# Patient Record
Sex: FEMALE | Race: WHITE | NOT HISPANIC OR LATINO | URBAN - METROPOLITAN AREA
[De-identification: names, ages, dates, MRNs, and addresses within clinical notes are randomized per-mention and may not be internally consistent; named-entity substitution may affect disease eponyms.]

---

## 2021-08-19 ENCOUNTER — NEW PATIENT COMPREHENSIVE (OUTPATIENT)
Dept: URBAN - METROPOLITAN AREA CLINIC 49 | Facility: CLINIC | Age: 66
End: 2021-08-19

## 2021-08-19 DIAGNOSIS — H04.123: ICD-10-CM

## 2021-08-19 DIAGNOSIS — H25.811: ICD-10-CM

## 2021-08-19 DIAGNOSIS — H25.12: ICD-10-CM

## 2021-08-19 DIAGNOSIS — H43.812: ICD-10-CM

## 2021-08-19 PROCEDURE — 68761 CLOSE TEAR DUCT OPENING: CPT

## 2021-08-19 PROCEDURE — 92004 COMPRE OPH EXAM NEW PT 1/>: CPT

## 2021-08-19 PROCEDURE — 92134 CPTRZ OPH DX IMG PST SGM RTA: CPT

## 2021-08-19 ASSESSMENT — VISUAL ACUITY
OD_GLARE: 20/30
OS_SC: 20/80-1
OU_SC: J1
OD_PH: 20/30-1
OS_PH: 20/70+2
OD_GLARE: 20/30
OS_GLARE: 20/60
OS_GLARE: 20/60
OD_SC: 20/50

## 2021-08-19 ASSESSMENT — TONOMETRY
OS_IOP_MMHG: 15
OD_IOP_MMHG: 16

## 2021-08-19 NOTE — PATIENT DISCUSSION
Discussed with patient that in addition to this treatment it is recommended to do hydro gel punctal plugs bilateral lower lids today.

## 2021-08-19 NOTE — PATIENT DISCUSSION
discussed with patient that having chemo can cause dryness,  recommend patient increase artificial tears to 4 times a day (get the omega 3 refresh) and start artificial tear gel at bedtime.

## 2021-08-19 NOTE — PATIENT DISCUSSION
will recheck in 1 month,  if patient needs a little bit more relief will place bilateral upper lids.

## 2021-09-16 ENCOUNTER — 1 MONTH FOLLOW-UP (OUTPATIENT)
Dept: URBAN - METROPOLITAN AREA CLINIC 49 | Facility: CLINIC | Age: 66
End: 2021-09-16

## 2021-09-16 DIAGNOSIS — H04.123: ICD-10-CM

## 2021-09-16 DIAGNOSIS — Z98.890: ICD-10-CM

## 2021-09-16 PROCEDURE — 92012 INTRM OPH EXAM EST PATIENT: CPT

## 2021-09-16 ASSESSMENT — VISUAL ACUITY
OD_PH: 20/30-1
OS_SC: 20/60
OS_PH: 20/40
OD_SC: 20/40-2

## 2021-09-16 ASSESSMENT — TONOMETRY
OS_IOP_MMHG: 17
OD_IOP_MMHG: 16

## 2021-09-21 ENCOUNTER — BIOMETRY (OUTPATIENT)
Dept: URBAN - METROPOLITAN AREA CLINIC 49 | Facility: CLINIC | Age: 66
End: 2021-09-21

## 2021-09-21 DIAGNOSIS — H25.813: ICD-10-CM

## 2021-09-21 PROCEDURE — 92136 OPHTHALMIC BIOMETRY: CPT

## 2021-09-21 PROCEDURE — TOPOIOL CORNEAL TOPOGRAPHY-PREMIUM IOL

## 2021-09-21 ASSESSMENT — KERATOMETRY
OD_AXISANGLE_DEGREES: 121
OS_K1POWER_DIOPTERS: 47.37
OS_AXISANGLE2_DEGREES: 170
OS_AXISANGLE_DEGREES: 080
OD_K1POWER_DIOPTERS: 44.00
OD_AXISANGLE2_DEGREES: 31
OD_K2POWER_DIOPTERS: 43.37
OS_K2POWER_DIOPTERS: 45.00

## 2021-09-27 ENCOUNTER — PRE OP - CE/IOL OS (OUTPATIENT)
Dept: URBAN - METROPOLITAN AREA CLINIC 53 | Facility: CLINIC | Age: 66
End: 2021-09-27

## 2021-09-27 DIAGNOSIS — H43.812: ICD-10-CM

## 2021-09-27 DIAGNOSIS — H25.812: ICD-10-CM

## 2021-09-27 PROCEDURE — 92134 CPTRZ OPH DX IMG PST SGM RTA: CPT

## 2021-09-27 PROCEDURE — PREOP PRE OP VISIT

## 2021-09-27 ASSESSMENT — KERATOMETRY
OD_K1POWER_DIOPTERS: 44.00
OD_AXISANGLE_DEGREES: 121
OD_K2POWER_DIOPTERS: 43.37
OS_AXISANGLE_DEGREES: 080
OS_K2POWER_DIOPTERS: 45.00
OS_K1POWER_DIOPTERS: 47.37
OD_AXISANGLE2_DEGREES: 31
OS_AXISANGLE2_DEGREES: 170

## 2021-09-27 ASSESSMENT — VISUAL ACUITY
OD_PH: 20/25
OD_SC: 20/40
OS_PH: 20/40
OS_SC: 20/150

## 2021-09-27 ASSESSMENT — TONOMETRY
OS_IOP_MMHG: 14
OD_IOP_MMHG: 14

## 2021-09-27 NOTE — PATIENT DISCUSSION
CATARACT SURGERY PLANNER - TORIC IOL/+FEMTO: Phacoemulsification with IOL: Eye: left|DOS: 10/13/21|Model: [id number]|Power: 17(ORA)|Target: near|Femto: yes|Axis: 75°|Visc: duet|Omidria: yes|10% Phenylephrine: no|Epi-shugarcaine: yes|Phaco Setting: denst|BSS+: no|Trypan Blue: no|CTR: no|Olive Tip: no|Atropine: no|Pupilloplasty: no|Notes: HLASIK, MonoVA, Near.

## 2021-09-27 NOTE — PATIENT DISCUSSION
LENS OPTION (SUPERIOR MONO): Discussed with patient in detail all available methods and lens options as well as their associated benefits, limitations and out-of-pocket costs. Patient chooses femtosecond laser-assisted cataract surgery with toric monofocal lens implant set for monovision, right for distance, left for near. The patient also understands that with any IOL there is no guarantee that they will not require glasses to see their best at any distance after surgery. The risks, benefits and alternatives to surgery were explained and all questions were answered.

## 2021-10-13 ENCOUNTER — SURGERY/PROCEDURE (OUTPATIENT)
Dept: URBAN - METROPOLITAN AREA SURGERY 16 | Facility: SURGERY | Age: 66
End: 2021-10-13

## 2021-10-13 ENCOUNTER — SAME DAY PO (OUTPATIENT)
Dept: URBAN - METROPOLITAN AREA CLINIC 48 | Facility: CLINIC | Age: 66
End: 2021-10-13

## 2021-10-13 DIAGNOSIS — H25.812: ICD-10-CM

## 2021-10-13 DIAGNOSIS — Z96.1: ICD-10-CM

## 2021-10-13 DIAGNOSIS — Z98.42: ICD-10-CM

## 2021-10-13 PROCEDURE — DIUXXFEMTO EYHANCE TORIC IOL WITH FEMTO

## 2021-10-13 PROCEDURE — 66984 XCAPSL CTRC RMVL W/O ECP: CPT

## 2021-10-13 PROCEDURE — 99024 POSTOP FOLLOW-UP VISIT: CPT

## 2021-10-13 ASSESSMENT — KERATOMETRY
OD_AXISANGLE2_DEGREES: 31
OD_AXISANGLE_DEGREES: 121
OD_K1POWER_DIOPTERS: 44.00
OS_AXISANGLE_DEGREES: 080
OS_K1POWER_DIOPTERS: 47.37
OS_AXISANGLE2_DEGREES: 170
OS_K1POWER_DIOPTERS: 47.37
OS_K2POWER_DIOPTERS: 45.00
OD_K2POWER_DIOPTERS: 43.37
OS_AXISANGLE2_DEGREES: 170
OD_K1POWER_DIOPTERS: 44.00
OS_AXISANGLE_DEGREES: 080
OD_AXISANGLE2_DEGREES: 31
OS_K2POWER_DIOPTERS: 45.00
OD_K2POWER_DIOPTERS: 43.37
OD_AXISANGLE_DEGREES: 121

## 2021-10-13 ASSESSMENT — TONOMETRY: OS_IOP_MMHG: 14

## 2021-10-13 ASSESSMENT — VISUAL ACUITY: OS_CC: 20/100-1

## 2021-10-29 ENCOUNTER — PRE OP - CE/IOL OD / 1 WEEK PO OS (OUTPATIENT)
Dept: URBAN - METROPOLITAN AREA CLINIC 49 | Facility: CLINIC | Age: 66
End: 2021-10-29

## 2021-10-29 DIAGNOSIS — H43.813: ICD-10-CM

## 2021-10-29 DIAGNOSIS — Z98.42: ICD-10-CM

## 2021-10-29 PROCEDURE — 92134 CPTRZ OPH DX IMG PST SGM RTA: CPT

## 2021-10-29 PROCEDURE — 99024 POSTOP FOLLOW-UP VISIT: CPT

## 2021-10-29 RX ORDER — CYCLOSPORINE 0.5 MG/ML: 1 EMULSION OPHTHALMIC TWICE A DAY

## 2021-10-29 RX ORDER — B-COMPLEX WITH VITAMIN C: CAPSULE ORAL TWICE A DAY

## 2021-10-29 ASSESSMENT — KERATOMETRY
OS_K2POWER_DIOPTERS: 45.00
OD_AXISANGLE2_DEGREES: 31
OD_AXISANGLE_DEGREES: 121
OS_AXISANGLE2_DEGREES: 170
OS_AXISANGLE_DEGREES: 080
OD_K1POWER_DIOPTERS: 44.00
OD_K2POWER_DIOPTERS: 43.37
OS_K1POWER_DIOPTERS: 47.37

## 2021-10-29 ASSESSMENT — VISUAL ACUITY
OD_PH: 20/30+1
OS_PH: 20/30-1
OD_SC: 20/40-1
OS_SC: J1+(-1)
OU_SC: 20/40-1
OU_SC: J1+(-2)
OS_SC: 20/100-1

## 2021-10-29 ASSESSMENT — TONOMETRY
OD_IOP_MMHG: 13
OS_IOP_MMHG: 14

## 2022-02-04 ENCOUNTER — PRE-OP/H&P (OUTPATIENT)
Dept: URBAN - METROPOLITAN AREA CLINIC 49 | Facility: CLINIC | Age: 67
End: 2022-02-04

## 2022-02-04 DIAGNOSIS — H25.811: ICD-10-CM

## 2022-02-04 PROCEDURE — PREOP PRE OP VISIT

## 2022-02-04 PROCEDURE — 92136 - 2N OPHTHALMIC BIOMETRY BY PARTIAL COHERENCE INTERFEROMETRY WITH INTRAOCULAR LENS POWER CALCULATION

## 2022-02-04 ASSESSMENT — KERATOMETRY
OD_AXISANGLE2_DEGREES: 31
OS_K2POWER_DIOPTERS: 45.00
OD_K2POWER_DIOPTERS: 43.37
OD_K1POWER_DIOPTERS: 44.00
OS_K1POWER_DIOPTERS: 47.37
OS_AXISANGLE_DEGREES: 080
OS_AXISANGLE2_DEGREES: 170
OD_AXISANGLE_DEGREES: 121

## 2022-02-04 ASSESSMENT — VISUAL ACUITY
OD_GLARE: 20/50
OS_SC: 20/50-2
OS_SC: J1
OS_PH: 20/30-1
OD_PH: 20/30-1
OD_SC: 20/40-2
OD_GLARE: 20/70

## 2022-02-04 ASSESSMENT — TONOMETRY
OD_IOP_MMHG: 14
OS_IOP_MMHG: 13

## 2022-02-04 NOTE — PATIENT DISCUSSION
CATARACT SURGERY PLANNER - STANDARD IOL/+FEMTO: Phacoemulsification with IOL: Eye: OD|DOS: 02/09/2022|Model: [id number]|Power: 19. 5|Target: PLANO|Femto: YES|Arcs: 34 @ 100 ; 34 @ 280|Visc: duet|Omidria: YES|10% Phenylephrine: NO|Epi-shugarcaine: YES|Phaco Setting: DENSE|BSS+: NO|Trypan Blue: NO|CTR: NO|Olive Tip: NO|Atropine: NO|Pupilloplasty: NO|Notes: H LASIK.

## 2022-02-09 ENCOUNTER — POST-OP (OUTPATIENT)
Dept: URBAN - METROPOLITAN AREA CLINIC 48 | Facility: CLINIC | Age: 67
End: 2022-02-09

## 2022-02-09 ENCOUNTER — SURGERY/PROCEDURE (OUTPATIENT)
Dept: URBAN - METROPOLITAN AREA SURGERY 16 | Facility: SURGERY | Age: 67
End: 2022-02-09

## 2022-02-09 DIAGNOSIS — H25.811: ICD-10-CM

## 2022-02-09 DIAGNOSIS — Z98.41: ICD-10-CM

## 2022-02-09 PROBLEM — Z96.1: Noted: 2022-02-09

## 2022-02-09 PROCEDURE — 99024 POSTOP FOLLOW-UP VISIT: CPT

## 2022-02-09 PROCEDURE — 66984 XCAPSL CTRC RMVL W/O ECP: CPT

## 2022-02-09 ASSESSMENT — KERATOMETRY
OD_AXISANGLE2_DEGREES: 31
OD_AXISANGLE2_DEGREES: 31
OS_K1POWER_DIOPTERS: 47.37
OD_K2POWER_DIOPTERS: 43.37
OD_K1POWER_DIOPTERS: 44.00
OS_K2POWER_DIOPTERS: 45.00
OS_K1POWER_DIOPTERS: 47.37
OS_K2POWER_DIOPTERS: 45.00
OS_AXISANGLE_DEGREES: 080
OS_AXISANGLE_DEGREES: 080
OS_AXISANGLE2_DEGREES: 170
OD_K2POWER_DIOPTERS: 43.37
OD_AXISANGLE_DEGREES: 121
OD_K1POWER_DIOPTERS: 44.00
OD_AXISANGLE_DEGREES: 121
OS_AXISANGLE2_DEGREES: 170

## 2022-02-09 ASSESSMENT — VISUAL ACUITY: OD_SC: 20/50

## 2022-02-09 ASSESSMENT — TONOMETRY: OD_IOP_MMHG: 28

## 2022-02-25 ENCOUNTER — POST-OP (OUTPATIENT)
Dept: URBAN - METROPOLITAN AREA CLINIC 49 | Facility: CLINIC | Age: 67
End: 2022-02-25

## 2022-02-25 DIAGNOSIS — Z96.1: ICD-10-CM

## 2022-02-25 DIAGNOSIS — Z98.41: ICD-10-CM

## 2022-02-25 PROCEDURE — 99024 POSTOP FOLLOW-UP VISIT: CPT

## 2022-02-25 ASSESSMENT — KERATOMETRY
OD_AXISANGLE_DEGREES: 121
OD_K2POWER_DIOPTERS: 43.37
OD_K1POWER_DIOPTERS: 44.00
OS_K1POWER_DIOPTERS: 47.37
OS_K2POWER_DIOPTERS: 45.00
OD_AXISANGLE2_DEGREES: 31
OS_AXISANGLE_DEGREES: 080
OS_AXISANGLE2_DEGREES: 170

## 2022-02-25 ASSESSMENT — VISUAL ACUITY
OS_PH: 20/25-2
OS_SC: 20/60
OD_SC: 20/30-1
OD_PH: 20/30+2
OD_SC: J5
OD_SC: J5

## 2022-02-25 ASSESSMENT — TONOMETRY
OS_IOP_MMHG: 10
OD_IOP_MMHG: 12

## 2022-03-11 ENCOUNTER — POST-OP (OUTPATIENT)
Dept: URBAN - METROPOLITAN AREA CLINIC 49 | Facility: CLINIC | Age: 67
End: 2022-03-11

## 2022-03-11 DIAGNOSIS — Z98.41: ICD-10-CM

## 2022-03-11 DIAGNOSIS — Z96.1: ICD-10-CM

## 2022-03-11 PROCEDURE — 92015 DETERMINE REFRACTIVE STATE: CPT

## 2022-03-11 PROCEDURE — 99024 POSTOP FOLLOW-UP VISIT: CPT

## 2022-03-11 RX ORDER — CYCLOSPORINE 0.5 MG/ML: 1 EMULSION OPHTHALMIC TWICE A DAY

## 2022-03-11 ASSESSMENT — TONOMETRY
OD_IOP_MMHG: 07
OS_IOP_MMHG: 10

## 2022-03-11 ASSESSMENT — VISUAL ACUITY
OS_SC: J2@26"
OD_SC: J2@17"
OS_SC: J1+@17"
OD_SC: J1@26"
OS_SC: 20/70+2
OS_PH: 20/30
OD_SC: 20/30-2
OD_PH: 20/25

## 2024-03-21 ENCOUNTER — COMPREHENSIVE EXAM (OUTPATIENT)
Dept: URBAN - METROPOLITAN AREA CLINIC 49 | Facility: LOCATION | Age: 69
End: 2024-03-21

## 2024-03-21 DIAGNOSIS — H26.493: ICD-10-CM

## 2024-03-21 DIAGNOSIS — H43.813: ICD-10-CM

## 2024-03-21 DIAGNOSIS — H04.123: ICD-10-CM

## 2024-03-21 PROCEDURE — 92015 DETERMINE REFRACTIVE STATE: CPT

## 2024-03-21 PROCEDURE — 99214 OFFICE O/P EST MOD 30 MIN: CPT

## 2024-03-21 PROCEDURE — 92134 CPTRZ OPH DX IMG PST SGM RTA: CPT

## 2024-03-21 RX ORDER — CARBOXYMETHYLCELLULOSE SODIUM 5 MG/ML
1 SOLUTION/ DROPS OPHTHALMIC TWICE A DAY
Start: 2024-03-21

## 2024-03-21 RX ORDER — CYCLOSPORINE 0.5 MG/ML
1 EMULSION OPHTHALMIC TWICE A DAY
Start: 2024-03-21

## 2024-03-21 ASSESSMENT — TONOMETRY
OS_IOP_MMHG: 14
OD_IOP_MMHG: 14

## 2024-03-21 ASSESSMENT — VISUAL ACUITY
OD_SC: 20/25-2
OD_GLARE: 20/25
OS_GLARE: 20/40
OU_SC: 20/25
OU_SC: J3@16"
OS_PH: 20/30
OD_GLARE: 20/25
OS_SC: 20/70
OS_GLARE: 20/40

## 2025-04-03 ENCOUNTER — COMPREHENSIVE EXAM (OUTPATIENT)
Age: 70
End: 2025-04-03

## 2025-04-03 DIAGNOSIS — H52.4: ICD-10-CM

## 2025-04-03 DIAGNOSIS — H04.123: ICD-10-CM

## 2025-04-03 DIAGNOSIS — H43.813: ICD-10-CM

## 2025-04-03 DIAGNOSIS — Z98.890: ICD-10-CM

## 2025-04-03 DIAGNOSIS — H26.493: ICD-10-CM

## 2025-04-03 PROCEDURE — 92015 DETERMINE REFRACTIVE STATE: CPT

## 2025-04-03 PROCEDURE — 99214 OFFICE O/P EST MOD 30 MIN: CPT
